# Patient Record
Sex: MALE | Race: OTHER
[De-identification: names, ages, dates, MRNs, and addresses within clinical notes are randomized per-mention and may not be internally consistent; named-entity substitution may affect disease eponyms.]

---

## 2019-10-20 ENCOUNTER — HOSPITAL ENCOUNTER (EMERGENCY)
Dept: HOSPITAL 72 - EMR | Age: 29
LOS: 1 days | Discharge: HOME | End: 2019-10-21
Payer: COMMERCIAL

## 2019-10-20 VITALS — BODY MASS INDEX: 19.29 KG/M2 | WEIGHT: 120 LBS | HEIGHT: 66 IN

## 2019-10-20 VITALS — DIASTOLIC BLOOD PRESSURE: 82 MMHG | SYSTOLIC BLOOD PRESSURE: 125 MMHG
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
## 2019-10-20 DIAGNOSIS — S61.011A: Primary | ICD-10-CM
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
## 2019-10-20 DIAGNOSIS — Z23: ICD-10-CM

## 2019-10-20 DIAGNOSIS — F17.200: ICD-10-CM

## 2019-10-20 PROCEDURE — 90715 TDAP VACCINE 7 YRS/> IM: CPT

## 2019-10-20 PROCEDURE — 90471 IMMUNIZATION ADMIN: CPT

## 2019-10-20 PROCEDURE — 99283 EMERGENCY DEPT VISIT LOW MDM: CPT

## 2019-10-20 NOTE — NUR
ED Nurse Note:



Patient walked in to ER c/o laceration on his left thumb. AAO x4, VSS at this 
time, skin is warm to touch.

## 2019-10-20 NOTE — NUR
ED Nurse Note:



4 stitches were applyed by Dr. Dewitt, on pt's right thumb, patient tolerated 
procedure well.

## 2019-10-21 VITALS — DIASTOLIC BLOOD PRESSURE: 82 MMHG | SYSTOLIC BLOOD PRESSURE: 125 MMHG

## 2019-10-21 NOTE — EMERGENCY ROOM REPORT
History of Present Illness


General


Chief Complaint:  Laceration


Source:  Patient





Present Illness


HPI


The patient was cleaning a machine with his right hand.  He sustained a cut to 

his thumb.  The patient is right-handed.  He has severe pain and bleeding.  The 

bleeding was controlled with local pressure and there was not much blood loss.  

He rates the pain 5/10, sharp and throbbing.  Its been improving and does not 

radiate.  He denies any numbness.  There is full range of motion.





Last tetanus shot 12 years ago.





No medical problems.  He denies diabetes.





Patient right-handed


Allergies:  


Coded Allergies:  


     No Known Allergies (Unverified , 10/20/19)





Patient History


Past Medical History:  see triage record


Social History:  Reports: smoking


Social History Narrative


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


Reviewed Nursing Documentation:  PMH: Agreed; PSxH: Agreed





Nursing Documentation-PMH


Past Medical History:  No Stated History





Review of Systems


Constitutional:  Denies: fever


Musculoskeletal:  Reports: see HPI


Skin:  Reports: see HPI


Neurological:  Reports: see HPI


Hematologic/Lymphatic:  Reports: see HPI





Physical Exam





Vital Signs








  Date Time  Temp Pulse Resp B/P (MAP) Pulse Ox O2 Delivery O2 Flow Rate FiO2


 


10/20/19 22:47 97.9 66 18 125/82 (96) 95 Room Air  








Sp02 EP Interpretation:  reviewed, normal


General Appearance:  well appearing, no apparent distress, GCS 15


Head:  normocephalic


Eyes:  bilateral eye normal inspection, bilateral eye PERRL


ENT:  moist mucus membranes


Neck:  full range of motion


Respiratory:  normal inspection


Cardiovascular #1:  regular rate, rhythm


Cardiovascular #2:  2+ radial (R) - Good capillary refill


Gastrointestinal:  normal inspection


Musculoskeletal:  gait/station normal, normal range of motion, other - Thumb 

tendons fully intact


Neurologic:  alert, distal neuro normal, grossly normal


Psychiatric:  mood/affect normal


Skin:  warm/dry, laceration - Right thumb





Procedures


Laceration/Wound Repair


Laceration/Wound Repair :  


   Consent:  Verbal


   Wound Location:  upper extremity - Right thumb


   Wound's Depth, Shape:  superficial


   Wound Length (cm):  2


   Wound Explored:  clean


   Irrigated w/ Saline (ccs):  20


   Betadine Prep?:  Yes


   Anesthesia:  1% Lidocaine


   Volume Anesthetic (ccs):  1


   Wound Debrided:  None


   Wound Repaired With:  sutures


   Suture Size/Type:  6:0, proline


   Layer Closure?:  No


   Sterile Dressing Applied?:  Yes


   Sling Applied?:  No


   Patient Tolerated:  Well


   Complications:  None





Medical Decision Making


Diagnostic Impression:  


 Primary Impression:  


 Laceration of right thumb


 Qualified Codes:  S61.011A - Laceration without foreign body of right thumb 

without damage to nail, initial encounter


ER Course


Patient presents with laceration of his right thumb.  Based on exam tendons are 

intact and distal neurovascular is intact.  Considerations for using Dermabond 

versus sutures is undertaken.  Due to the fact that this needs to be cleaned 

and irrigated in the depth of the wound and the problem with bleeding in his 

occupation sutures are chosen.  In addition the patient needs to have tetanus 

vaccination.





See procedure note.





Discussed treatment plan with patient.





Pain resolved completely with analgesia (as opposed to documentation in nursing 

notes).





Patient stable for outpatient observation and treatment.





Last Vital Signs








  Date Time  Temp Pulse Resp B/P (MAP) Pulse Ox O2 Delivery O2 Flow Rate FiO2


 


10/21/19 00:21 97.9  18 125/82 95 Room Air  


 


10/20/19 22:47  66      








Status:  improved


Disposition:  HOME, SELF-CARE


Condition:  Improved


Scripts


Bacitracin (Bacitracin) 28.4 Gm Oint...g.


1 APPLIC TOPIC BID, #20 GM


   Prov: Pawel Dewitt MD         10/21/19 


Acetaminophen (Tylenol) 325 Mg Tablet


650 MG ORAL Q6H PRN for Prn Pain/Headache/Temp > 101, #16 TAB 0 Refills


   Prov: Pawel Dewitt MD         10/21/19


Referrals:  


NOT CHOSEN [facility]/MD,REFERRING (PCP)











Pawel Dewitt MD Oct 21, 2019 00:01

## 2019-10-28 ENCOUNTER — HOSPITAL ENCOUNTER (EMERGENCY)
Dept: HOSPITAL 72 - EMR | Age: 29
Discharge: HOME | End: 2019-10-28
Payer: COMMERCIAL

## 2019-10-28 VITALS — BODY MASS INDEX: 20.49 KG/M2 | WEIGHT: 120 LBS | HEIGHT: 64 IN

## 2019-10-28 VITALS — SYSTOLIC BLOOD PRESSURE: 117 MMHG | DIASTOLIC BLOOD PRESSURE: 78 MMHG

## 2019-10-28 DIAGNOSIS — S61.011D: Primary | ICD-10-CM

## 2019-10-28 DIAGNOSIS — Z48.02: ICD-10-CM
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
## 2019-10-28 PROCEDURE — 99281 EMR DPT VST MAYX REQ PHY/QHP: CPT

## 2019-10-28 NOTE — EMERGENCY ROOM REPORT
History of Present Illness


General


Chief Complaint:  Wound Recheck/Suture Removal


Source:  Patient





Present Illness


HPI


28-year-old male with no symptom past with a history here requesting suture 

removal from his thumb.  The sutures were placed in 1 week ago at [facility].  

Patient denies any pus drainage, fever and chills, and has been compliant with 

medication intake.  Denies any new injury.  Has full range of motion, no motor 

or sensory deficits are noted.


Allergies:  


Coded Allergies:  


     No Known Allergies (Unverified , 10/20/19)





Patient History


Past Medical History:  see triage record


Past Surgical History:  unable to obtain


Pertinent Family History:  none


Immunizations:  UTD


Reviewed Nursing Documentation:  PMH: Agreed; PSxH: Agreed





Nursing Documentation-PMH


Past Medical History:  No Stated History





Review of Systems


All Other Systems:  negative except mentioned in HPI





Physical Exam





Vital Signs








  Date Time  Temp Pulse Resp B/P (MAP) Pulse Ox O2 Delivery O2 Flow Rate FiO2


 


10/28/19 15:15 97.7 64 14 117/78 (91) 96 Room Air  








Sp02 EP Interpretation:  reviewed, normal


General Appearance:  no apparent distress, alert, GCS 15, non-toxic


Head:  normocephalic, atraumatic


Eyes:  bilateral eye normal inspection, bilateral eye PERRL


ENT:  hearing grossly normal, normal pharynx, no angioedema, normal voice


Neck:  full range of motion, supple/symm/no masses


Respiratory:  chest non-tender, lungs clear, normal breath sounds, speaking 

full sentences


Cardiovascular #1:  regular rate, rhythm, no edema, no murmur


Cardiovascular #2:  2+ radial (R), 2+ radial (L)


Gastrointestinal:  normal bowel sounds, non tender, soft, non-distended, no 

guarding, no rebound


Genitourinary:  no CVA tenderness


Musculoskeletal:  back normal, digits/nails normal, gait/station normal


Neurologic:  alert, oriented x3, responsive, motor strength/tone normal, 

sensory intact, speech normal


Psychiatric:  judgement/insight normal, memory normal, mood/affect normal, no 

suicidal/homicidal ideation


Skin:  other - Healed laceration right thumb with 4 sutures placed in





Procedures


Additional Procedure


Procedure Narrative


4 Sutures removed from right thumb





Medical Decision Making


PA Attestation


All my diagnosis and treatment plans were reviewed ad discussed with my 

supervising physician Dr. Spence


Diagnostic Impression:  


 Primary Impression:  


 Encounter for removal of sutures


ER Course


28-year-old male with no symptom past with a history here requesting suture 

removal from his thumb.  The sutures were placed in 1 week ago at [facility].  

Patient denies any pus drainage, fever and chills, and has been compliant with 

medication intake.  Denies any new injury.  Has full range of motion, no motor 

or sensory deficits are noted.





Ddx considered but are not limited to : Superficial laceration, deep laceration

, tendon involvement with laceration, laceration with foreign body





Vital signs: are WNL, pt. is afebrile





H&PE are most consistent with: Encounter for suture removal without infection





ORDERS: Ibuprofen


ED INTERVENTIONS: Suture removal





DISCHARGE: At this time pt. is stable for d/c to home. Will provide printed 

patient care instructions, and any necessary prescriptions. Care plan and 

follow up instructions have been discussed with the patient prior to discharge.





Last Vital Signs








  Date Time  Temp Pulse Resp B/P (MAP) Pulse Ox O2 Delivery O2 Flow Rate FiO2


 


10/28/19 15:15 97.7 64 14 117/78 (91) 96 Room Air  








Disposition:  HOME, SELF-CARE


Condition:  Stable


Scripts


Ibuprofen* (MOTRIN*) 600 Mg Tablet


600 MG ORAL Q8H PRN for For Pain, #15 TAB 0 Refills


   Prov: Abilio Soto         10/28/19


Patient Instructions:  Suture Removal, Care After











Abilio Soto Oct 28, 2019 15:44

## 2020-02-21 ENCOUNTER — HOSPITAL ENCOUNTER (EMERGENCY)
Dept: HOSPITAL 72 - EMR | Age: 30
Discharge: HOME | End: 2020-02-21
Payer: SELF-PAY

## 2020-02-21 VITALS — BODY MASS INDEX: 21.38 KG/M2 | HEIGHT: 66 IN | WEIGHT: 133 LBS

## 2020-02-21 VITALS — DIASTOLIC BLOOD PRESSURE: 78 MMHG | SYSTOLIC BLOOD PRESSURE: 128 MMHG

## 2020-02-21 VITALS — SYSTOLIC BLOOD PRESSURE: 124 MMHG | DIASTOLIC BLOOD PRESSURE: 80 MMHG

## 2020-02-21 VITALS — DIASTOLIC BLOOD PRESSURE: 78 MMHG | SYSTOLIC BLOOD PRESSURE: 125 MMHG

## 2020-02-21 DIAGNOSIS — N39.0: ICD-10-CM

## 2020-02-21 DIAGNOSIS — K52.9: Primary | ICD-10-CM

## 2020-02-21 LAB
ADD MANUAL DIFF: NO
ALBUMIN SERPL-MCNC: 4.3 G/DL (ref 3.4–5)
ALBUMIN/GLOB SERPL: 1.2 {RATIO} (ref 1–2.7)
ALP SERPL-CCNC: 110 U/L (ref 46–116)
ALT SERPL-CCNC: 26 U/L (ref 12–78)
ANION GAP SERPL CALC-SCNC: 11 MMOL/L (ref 5–15)
APPEARANCE UR: CLEAR
APTT PPP: YELLOW S
AST SERPL-CCNC: 20 U/L (ref 15–37)
BASOPHILS NFR BLD AUTO: 1.3 % (ref 0–2)
BILIRUB SERPL-MCNC: 0.7 MG/DL (ref 0.2–1)
BUN SERPL-MCNC: 7 MG/DL (ref 7–18)
CALCIUM SERPL-MCNC: 9.5 MG/DL (ref 8.5–10.1)
CHLORIDE SERPL-SCNC: 102 MMOL/L (ref 98–107)
CO2 SERPL-SCNC: 30 MMOL/L (ref 21–32)
CREAT SERPL-MCNC: 0.7 MG/DL (ref 0.55–1.3)
EOSINOPHIL NFR BLD AUTO: 1.1 % (ref 0–3)
ERYTHROCYTE [DISTWIDTH] IN BLOOD BY AUTOMATED COUNT: 10.7 % (ref 11.6–14.8)
GLOBULIN SER-MCNC: 3.6 G/DL
GLUCOSE UR STRIP-MCNC: NEGATIVE MG/DL
HCT VFR BLD CALC: 49.2 % (ref 42–52)
HGB BLD-MCNC: 17.2 G/DL (ref 14.2–18)
KETONES UR QL STRIP: (no result)
LEUKOCYTE ESTERASE UR QL STRIP: (no result)
LYMPHOCYTES NFR BLD AUTO: 24 % (ref 20–45)
MCV RBC AUTO: 89 FL (ref 80–99)
MONOCYTES NFR BLD AUTO: 5.6 % (ref 1–10)
NEUTROPHILS NFR BLD AUTO: 68 % (ref 45–75)
NITRITE UR QL STRIP: NEGATIVE
PH UR STRIP: 8 [PH] (ref 4.5–8)
PLATELET # BLD: 361 K/UL (ref 150–450)
POTASSIUM SERPL-SCNC: 3.5 MMOL/L (ref 3.5–5.1)
PROT UR QL STRIP: NEGATIVE
RBC # BLD AUTO: 5.55 M/UL (ref 4.7–6.1)
SODIUM SERPL-SCNC: 143 MMOL/L (ref 136–145)
SP GR UR STRIP: 1.01 (ref 1–1.03)
UROBILINOGEN UR-MCNC: 1 MG/DL (ref 0–1)
WBC # BLD AUTO: 10.2 K/UL (ref 4.8–10.8)

## 2020-02-21 PROCEDURE — 74177 CT ABD & PELVIS W/CONTRAST: CPT

## 2020-02-21 PROCEDURE — 96374 THER/PROPH/DIAG INJ IV PUSH: CPT

## 2020-02-21 PROCEDURE — 80307 DRUG TEST PRSMV CHEM ANLYZR: CPT

## 2020-02-21 PROCEDURE — 96375 TX/PRO/DX INJ NEW DRUG ADDON: CPT

## 2020-02-21 PROCEDURE — 85025 COMPLETE CBC W/AUTO DIFF WBC: CPT

## 2020-02-21 PROCEDURE — 96361 HYDRATE IV INFUSION ADD-ON: CPT

## 2020-02-21 PROCEDURE — 36415 COLL VENOUS BLD VENIPUNCTURE: CPT

## 2020-02-21 PROCEDURE — 99284 EMERGENCY DEPT VISIT MOD MDM: CPT

## 2020-02-21 PROCEDURE — 80053 COMPREHEN METABOLIC PANEL: CPT

## 2020-02-21 PROCEDURE — 83690 ASSAY OF LIPASE: CPT

## 2020-02-21 PROCEDURE — 81003 URINALYSIS AUTO W/O SCOPE: CPT

## 2020-02-21 NOTE — DIAGNOSTIC IMAGING REPORT
Clinical Indication: 

 

Abdominal pain, nausea, vomiting, diarrhea

 

Technique:   No oral contrast utilized, per emergency room physician request  IV

administration nonionic contrast. Venous phase spiral acquisition obtained through

the abdomen and pelvis. Multiplanar reconstructions were generated. Total dose length

product 179 mGycm. CTDIvol(s) 3 mGy. Dose reduction achieved using automated exposure

control

 

 

Comparison: none

 

Findings: Lack of enteric contrast limits assessment of the GI tract. There is

equivocal minimal wall thickening of ascending colon. The appendix is normal. There

is equivocal slight wall thickening of the descending colon and minimal infiltration

of the pericolonic fat.  No free or loculated intraperitoneal gas or fluid is

evident. Left upper quadrant small bowel loops are slightly prominent in caliber and

may exhibit slight wall thickening. The distal esophagus, stomach, duodenum are

unremarkable.

 

The liver, gallbladder, bile ducts, pancreas, spleen, adrenals are unremarkable. The

bladder is markedly distended. There is mild ectasia of the ureters and renal

collecting systems, ureteral ectasia extending to the ureteral orifices. No

retroperitoneal or mesenteric mass or adenopathy. No pelvic mass or adenopathy. The

prostate is somewhat prominent for the patient's age.

 

The included lung bases are clear. The bones are unremarkable

 

Impression: Limited assessment of the GI tract, due to lack of enteric contrast

administration Equivocal ascending and descending colon wall thickening. Equivocal

slight prominence and wall thickening of the proximal small bowel. If real, findings

may indicate enteritis and/or colitis changes.

 

Markedly distended bladder

 

Ectasia of the bilateral renal collecting systems and ureters, suspect related to the

above

 

Prominent, for age, prostate

 

 

The CT scanner at [facility] is accredited by the [employer] College of

Radiology and the scans are performed using protocols designed to limit radiation

exposure to as low as reasonably achievable to attain images of sufficient resolution

adequate for diagnostic evaluation.

## 2020-02-21 NOTE — NUR
ED Nurse Note:

pt. aaox4. ambulatory. walked in to er from home. per pt, he has been having 
abd pain accompanied with n/v/d x 4 days now. pt. has not actively vomitted in 
the er since arrival. pt. is compliant. no s/s of acute distress noted at this 
time

## 2020-02-21 NOTE — EMERGENCY ROOM REPORT
History of Present Illness


General


Chief Complaint:  Abdominal Pain


Source:  Patient





Present Illness


HPI


29-year-old male with no significant past medical history here complaining of 2 

weeks of epigastric abdominal pain and multiple bouts of nonbloody emesis and 

diarrhea.  Denies any recent travel, fever and chills.  Denies weight loss.  

Denies URI symptoms.  Reports that he often eats a lot of salsa and street 

food.  Also eats a lot of spicy food.  Is in no distress and vital signs are 

within normal limits.  Denies any surgical history in the abdomen.  Denies 

urinary symptoms at this time.  Reports that the pain and vomiting started 

about 2030 minutes post eating.  Has not taken medication for symptom relief.


Allergies:  


Coded Allergies:  


     No Known Allergies (Unverified , 10/20/19)





Patient History


Past Medical History:  see triage record


Past Surgical History:  unable to obtain


Pertinent Family History:  none


Immunizations:  UTD


Reviewed Nursing Documentation:  PMH: Agreed; PSxH: Agreed





Nursing Documentation-PMH


Past Medical History:  No Stated History





Review of Systems


All Other Systems:  negative except mentioned in HPI





Physical Exam





Vital Signs








  Date Time  Temp Pulse Resp B/P (MAP) Pulse Ox O2 Delivery O2 Flow Rate FiO2


 


2/21/20 13:31  73 17   Room Air  


 


2/21/20 13:31 98.4   128/78 99   








Sp02 EP Interpretation:  reviewed, normal


General Appearance:  no apparent distress, alert, GCS 15, non-toxic


Head:  normocephalic, atraumatic


Eyes:  bilateral eye normal inspection, bilateral eye PERRL


ENT:  hearing grossly normal, normal pharynx, no angioedema, normal voice


Neck:  full range of motion, supple/symm/no masses


Respiratory:  chest non-tender, lungs clear, normal breath sounds, no rhonchi, 

no respiratory distress, no retraction, no wheezing, speaking full sentences


Cardiovascular #1:  regular rate, rhythm, no edema, no murmur, normal capillary 

refill


Gastrointestinal:  normal bowel sounds, non tender, soft, non-distended, no 

guarding, no rebound


Rectal:  deferred


Genitourinary:  no CVA tenderness


Musculoskeletal:  back normal, no calf tenderness


Neurologic:  alert, motor strength/tone normal, oriented x3, sensory intact, 

responsive, speech normal


Psychiatric:  judgement/insight normal, memory normal, mood/affect normal, no 

suicidal/homicidal ideation


Skin:  no rash


Lymphatic:  no adenopathy





Medical Decision Making


PA Attestation


All diagnoses and treatment plans were reviewed and discussed with my 

supervising physician Dr. Dewitt


Diagnostic Impression:  


 Primary Impression:  


 Colitis


 Additional Impression:  


 UTI (urinary tract infection)


ER Course


29-year-old male with no significant past medical history here complaining of 2 

weeks of epigastric abdominal pain and multiple bouts of nonbloody emesis and 

diarrhea.  Denies any recent travel, fever and chills.  Denies weight loss.  

Denies URI symptoms.  Reports that he often eats a lot of salsa and street 

food.  Also eats a lot of spicy food.  Is in no distress and vital signs are 

within normal limits.  Denies any surgical history in the abdomen.  Denies 

urinary symptoms at this time.  Reports that the pain and vomiting started 

about 2030 minutes post eating.  Has not taken medication for symptom relief.





Ddx considered but are not limited to: appendicitis, cholecystis, gastritis, 

gastroenteritis, UTI, pyelonephritis, SBO, diverticulitis, influenza with GI 

manifestation, MI, gastric ulcer, peptic ulcer














Vital signs: are WNL, pt. is afebrile








 H&PE are most consistent with: Gastritis, colitis, UTI














ORDERS: abdominal CT, abdominal pain set, dicyclomine, ciprofloxacin to treat 

UTI causing the possible prostatitis, Zofran, omeprazole


ED INTERVENTIONS: NS bolus, Zofran, Toradol, Pepcid




















DISCHARGE: At this time pt. is stable for d/c to home. Will provide printed 

patient care instructions, and any necessary prescriptions. Care plan and 

follow up instructions have been discussed with the patient prior to discharge.

  Follow-up with your primary care provider for referral to gastroenterologist 

if symptoms continue also to be tested for H. pylori as well as stool cx and O 

and P.  If worsening symptoms return to the emergency room


CT/MRI/US Diagnostic Results


CT/MRI/US Diagnostic Results :  


   Imaging Test Ordered:  CT abdomen pelvis with contrast


   Impression


Colitis





Last Vital Signs








  Date Time  Temp Pulse Resp B/P (MAP) Pulse Ox O2 Delivery O2 Flow Rate FiO2


 


2/21/20 15:24 98.7 78 19 124/80 100 Room Air  








Disposition:  HOME, SELF-CARE


Condition:  Stable


Scripts


Ciprofloxacin Hcl* (CIPROFLOXACIN HCL*) 500 Mg Tablet


500 MG ORAL EVERY 12 HOURS for 7 Days, #14 TAB 0 Refills


   Prov: Abilio Soto         2/21/20 


Acetaminophen* (TYLENOL EXTRA STRENGTH*) 500 Mg Tablet


500 MG ORAL Q8H PRN for Prn Headache/Temp > 101, #30 TAB 0 Refills


   Prov: Abilio Soto         2/21/20 


Omeprazole (OMEPRAZOLE) 20 Mg Tablet.dr


20 MG ORAL DAILY for 30 Days, #30 TAB


   Prov: Abilio Soto         2/21/20 


Ondansetron (Zofran) 4 Mg Tablet


4 MG ORAL Q6H PRN for Nausea & Vomiting, #14 TAB


   Prov: Abilio Soto         2/21/20 


Dicyclomine Hcl* (DICYCLOMINE HCL*) 10 Mg Capsule


10 MG ORAL QID, #20 CAP


   Prov: Abilio Soto         2/21/20


Patient Instructions:  Abdominal Pain, Adult





Additional Instructions:  


Follow-up with your primary doctor for H. pylori testing, avoid eating spicy 

acidic food, take medication as directed, if worsening symptoms return to the 

emergency room he also need stool culture, ova and parasite for further 

bacterial testing which we do not do at the emergency department at this time 

as your symptoms are not acute and does not require hospitalization











Abilio Soto Feb 21, 2020 15:57